# Patient Record
Sex: FEMALE | Race: WHITE | Employment: FULL TIME | URBAN - METROPOLITAN AREA
[De-identification: names, ages, dates, MRNs, and addresses within clinical notes are randomized per-mention and may not be internally consistent; named-entity substitution may affect disease eponyms.]

---

## 2017-04-13 PROBLEM — R07.89 ATYPICAL CHEST PAIN: Status: ACTIVE | Noted: 2017-04-13

## 2017-05-25 PROBLEM — R07.89 ATYPICAL CHEST PAIN: Chronic | Status: ACTIVE | Noted: 2017-04-13

## 2017-05-25 PROBLEM — R00.2 PALPITATIONS: Chronic | Status: ACTIVE | Noted: 2017-04-13

## 2022-08-03 PROBLEM — R31.29 MICROHEMATURIA: Status: ACTIVE | Noted: 2022-08-03

## 2023-04-13 LAB — PAP SMEAR, EXTERNAL: NEGATIVE

## 2023-06-28 ENCOUNTER — PATIENT MESSAGE (OUTPATIENT)
Dept: FAMILY MEDICINE CLINIC | Age: 33
End: 2023-06-28

## 2023-06-28 DIAGNOSIS — R20.0 NUMBNESS AND TINGLING: ICD-10-CM

## 2023-06-28 DIAGNOSIS — R20.2 NUMBNESS AND TINGLING: ICD-10-CM

## 2023-06-28 DIAGNOSIS — Z13.220 SCREENING FOR LIPOID DISORDERS: Primary | ICD-10-CM

## 2023-06-28 DIAGNOSIS — R42 DIZZINESS: ICD-10-CM

## 2023-06-29 ENCOUNTER — HOSPITAL ENCOUNTER (OUTPATIENT)
Age: 33
Discharge: HOME | End: 2023-06-29
Payer: COMMERCIAL

## 2023-06-29 DIAGNOSIS — R20.2 NUMBNESS AND TINGLING: ICD-10-CM

## 2023-06-29 DIAGNOSIS — R42 DIZZINESS: ICD-10-CM

## 2023-06-29 DIAGNOSIS — Z13.220 SCREENING FOR LIPOID DISORDERS: ICD-10-CM

## 2023-06-29 DIAGNOSIS — R20.0 NUMBNESS AND TINGLING: ICD-10-CM

## 2023-06-29 LAB
ANION GAP SERPL CALCULATED.3IONS-SCNC: 15 MMOL/L (ref 9–17)
BASOPHILS # BLD: 0.04 K/UL (ref 0–0.2)
BASOPHILS NFR BLD: 1 % (ref 0–2)
BUN SERPL-MCNC: 12 MG/DL (ref 6–20)
CALCIUM SERPL-MCNC: 9.5 MG/DL (ref 8.6–10.4)
CHLORIDE SERPL-SCNC: 101 MMOL/L (ref 98–107)
CHOLEST SERPL-MCNC: 220 MG/DL
CHOLESTEROL/HDL RATIO: 2.5
CO2 SERPL-SCNC: 24 MMOL/L (ref 20–31)
CREAT SERPL-MCNC: 0.69 MG/DL (ref 0.5–0.9)
EOSINOPHIL # BLD: 0.07 K/UL (ref 0–0.44)
EOSINOPHILS RELATIVE PERCENT: 2 % (ref 1–4)
ERYTHROCYTE [DISTWIDTH] IN BLOOD BY AUTOMATED COUNT: 13.2 % (ref 11.8–14.4)
GFR SERPL CREATININE-BSD FRML MDRD: >60 ML/MIN/1.73M2
GLUCOSE SERPL-MCNC: 81 MG/DL (ref 70–99)
HCT VFR BLD AUTO: 42.1 % (ref 36.3–47.1)
HDLC SERPL-MCNC: 87 MG/DL
HGB BLD-MCNC: 13.4 G/DL (ref 11.9–15.1)
IMM GRANULOCYTES # BLD AUTO: <0.03 K/UL (ref 0–0.3)
IMM GRANULOCYTES NFR BLD: 0 %
LDLC SERPL CALC-MCNC: 118 MG/DL (ref 0–130)
LYMPHOCYTES # BLD: 28 % (ref 24–43)
LYMPHOCYTES NFR BLD: 1.35 K/UL (ref 1.1–3.7)
MCH RBC QN AUTO: 29.4 PG (ref 25.2–33.5)
MCHC RBC AUTO-ENTMCNC: 31.8 G/DL (ref 28.4–34.8)
MCV RBC AUTO: 92.3 FL (ref 82.6–102.9)
MONOCYTES NFR BLD: 0.39 K/UL (ref 0.1–1.2)
MONOCYTES NFR BLD: 8 % (ref 3–12)
NEUTROPHILS NFR BLD: 61 % (ref 36–65)
NEUTS SEG NFR BLD: 2.93 K/UL (ref 1.5–8.1)
NRBC BLD-RTO: 0 PER 100 WBC
PLATELET # BLD AUTO: 242 K/UL (ref 138–453)
PMV BLD AUTO: 9.7 FL (ref 8.1–13.5)
POTASSIUM SERPL-SCNC: 4.5 MMOL/L (ref 3.7–5.3)
RBC # BLD AUTO: 4.56 M/UL (ref 3.95–5.11)
SODIUM SERPL-SCNC: 140 MMOL/L (ref 135–144)
TRIGL SERPL-MCNC: 77 MG/DL
VIT B12 SERPL-MCNC: 785 PG/ML (ref 232–1245)
WBC OTHER # BLD: 4.8 K/UL (ref 3.5–11.3)

## 2023-06-29 PROCEDURE — 80048 BASIC METABOLIC PNL TOTAL CA: CPT

## 2023-06-29 PROCEDURE — 80061 LIPID PANEL: CPT

## 2023-06-29 PROCEDURE — 85027 COMPLETE CBC AUTOMATED: CPT

## 2023-06-29 PROCEDURE — 82607 VITAMIN B-12: CPT

## 2023-06-29 PROCEDURE — 36415 COLL VENOUS BLD VENIPUNCTURE: CPT

## 2024-03-09 ASSESSMENT — PATIENT HEALTH QUESTIONNAIRE - PHQ9
2. FEELING DOWN, DEPRESSED OR HOPELESS: SEVERAL DAYS
SUM OF ALL RESPONSES TO PHQ QUESTIONS 1-9: 2
1. LITTLE INTEREST OR PLEASURE IN DOING THINGS: 1
SUM OF ALL RESPONSES TO PHQ QUESTIONS 1-9: 2
SUM OF ALL RESPONSES TO PHQ QUESTIONS 1-9: 2
SUM OF ALL RESPONSES TO PHQ9 QUESTIONS 1 & 2: 2
2. FEELING DOWN, DEPRESSED OR HOPELESS: 1
SUM OF ALL RESPONSES TO PHQ QUESTIONS 1-9: 2
SUM OF ALL RESPONSES TO PHQ9 QUESTIONS 1 & 2: 2
1. LITTLE INTEREST OR PLEASURE IN DOING THINGS: SEVERAL DAYS

## 2024-03-11 ENCOUNTER — OFFICE VISIT (OUTPATIENT)
Dept: FAMILY MEDICINE CLINIC | Age: 34
End: 2024-03-11
Payer: COMMERCIAL

## 2024-03-11 VITALS
TEMPERATURE: 97.8 F | HEART RATE: 61 BPM | WEIGHT: 113 LBS | DIASTOLIC BLOOD PRESSURE: 64 MMHG | HEIGHT: 60 IN | BODY MASS INDEX: 22.19 KG/M2 | SYSTOLIC BLOOD PRESSURE: 112 MMHG | OXYGEN SATURATION: 99 %

## 2024-03-11 DIAGNOSIS — Z00.00 ENCOUNTER FOR WELL ADULT EXAM WITHOUT ABNORMAL FINDINGS: Primary | ICD-10-CM

## 2024-03-11 PROBLEM — I34.1 MITRAL VALVE PROLAPSE: Status: ACTIVE | Noted: 2024-03-11

## 2024-03-11 PROCEDURE — 99395 PREV VISIT EST AGE 18-39: CPT | Performed by: NURSE PRACTITIONER

## 2024-03-11 SDOH — ECONOMIC STABILITY: INCOME INSECURITY: HOW HARD IS IT FOR YOU TO PAY FOR THE VERY BASICS LIKE FOOD, HOUSING, MEDICAL CARE, AND HEATING?: NOT HARD AT ALL

## 2024-03-11 SDOH — ECONOMIC STABILITY: FOOD INSECURITY: WITHIN THE PAST 12 MONTHS, YOU WORRIED THAT YOUR FOOD WOULD RUN OUT BEFORE YOU GOT MONEY TO BUY MORE.: NEVER TRUE

## 2024-03-11 SDOH — ECONOMIC STABILITY: FOOD INSECURITY: WITHIN THE PAST 12 MONTHS, THE FOOD YOU BOUGHT JUST DIDN'T LAST AND YOU DIDN'T HAVE MONEY TO GET MORE.: NEVER TRUE

## 2024-03-11 SDOH — ECONOMIC STABILITY: HOUSING INSECURITY
IN THE LAST 12 MONTHS, WAS THERE A TIME WHEN YOU DID NOT HAVE A STEADY PLACE TO SLEEP OR SLEPT IN A SHELTER (INCLUDING NOW)?: NO

## 2024-03-11 ASSESSMENT — ENCOUNTER SYMPTOMS
ABDOMINAL PAIN: 0
SORE THROAT: 0
SHORTNESS OF BREATH: 0
SINUS PRESSURE: 0
RECTAL PAIN: 0
COUGH: 1
NAUSEA: 0
COLOR CHANGE: 0
CONSTIPATION: 0
DIARRHEA: 0
CHEST TIGHTNESS: 0
TROUBLE SWALLOWING: 0
WHEEZING: 0

## 2024-03-11 NOTE — PROGRESS NOTES
Well Adult Note  Name: Yoli Sanchez Today’s Date: 3/11/2024   MRN: [id number] Sex: Female   Age: 33 y.o. Ethnicity: Non- / Non    : 1990 Race: White (non-)      Yoli Sanchez is here for well adult exam.  History:  Doing well overall.  Recently moved back here from [facility] and got a divorce last year.  She is in a healthy relationship.  States she is trying to stay as active as she is able and trying to follow a healthy diet.  She had multiple illnesses in the last month and still has a lingering cough and mucus production.  Overall though much improved.    Review of Systems   Constitutional:  Negative for activity change, appetite change, chills, fatigue, fever and unexpected weight change.   HENT:  Negative for congestion, ear pain, postnasal drip, sinus pressure, sore throat and trouble swallowing.    Respiratory:  Positive for cough (lingering cough since recent illness). Negative for chest tightness, shortness of breath and wheezing.    Cardiovascular:  Negative for chest pain and leg swelling.   Gastrointestinal:  Negative for abdominal pain, constipation, diarrhea, nausea and rectal pain.   Endocrine: Negative for polydipsia, polyphagia and polyuria.   Genitourinary:  Negative for difficulty urinating, dyspareunia, dysuria, frequency, genital sores, menstrual problem, pelvic pain, vaginal bleeding, vaginal discharge and vaginal pain.   Musculoskeletal:  Negative for arthralgias and myalgias.   Skin:  Negative for color change and rash.   Neurological:  Negative for dizziness, weakness, numbness and headaches.   Hematological:  Negative for adenopathy.   Psychiatric/Behavioral:  Negative for dysphoric mood and sleep disturbance. The patient is not nervous/anxious.        No Known Allergies      Prior to Visit Medications    Not on File         Past Medical History:   Diagnosis Date    Acne     facial    Anxiety     depression? resolved    Depression

## 2024-03-11 NOTE — PROGRESS NOTES
Visit Information    Have you changed or started any medications since your last visit including any over-the-counter medicines, vitamins, or herbal medicines? no   Have you stopped taking any of your medications? Is so, why? -  no  Are you having any side effects from any of your medications? - no    Have you seen any other physician or provider since your last visit?  no   Have you had any other diagnostic tests since your last visit?  no   Have you been seen in the emergency room and/or had an admission in a hospital since we last saw you?  no   Have you had your routine dental cleaning in the past 6 months?  no     Do you have an active MyChart account? If no, what is the barrier?  Yes    Patient Care Team:  Tanya Perez APRN - CNP as PCP - General  Tanya Perez APRN - CNP as PCP - Empaneled Provider  Flako Amaya MD as Consulting Physician (Urology)    Medical History Review  Past Medical, Family, and Social History reviewed and  contribute to the patient presenting condition    Health Maintenance   Topic Date Due    Hepatitis B vaccine (1 of 3 - 3-dose series) Never done    COVID-19 Vaccine (1) Never done    Varicella vaccine (1 of 2 - 2-dose childhood series) Never done    DTaP/Tdap/Td vaccine (1 - Tdap) Never done    HPV vaccine (3 - 3-dose series) 12/09/2014    Flu vaccine (1) Never done    Cervical cancer screen  08/10/2023    Depression Screen  03/09/2025    Hepatitis C screen  Completed    HIV screen  Completed    Hepatitis A vaccine  Aged Out    Hib vaccine  Aged Out    Polio vaccine  Aged Out    Meningococcal (ACWY) vaccine  Aged Out    Pneumococcal 0-64 years Vaccine  Aged Out

## 2024-03-11 NOTE — PATIENT INSTRUCTIONS

## 2024-03-21 RX ORDER — AMOXICILLIN 875 MG/1
875 TABLET, COATED ORAL 2 TIMES DAILY
Qty: 20 TABLET | Refills: 0 | Status: SHIPPED | OUTPATIENT
Start: 2024-03-21 | End: 2024-03-31

## 2024-05-20 ENCOUNTER — PATIENT MESSAGE (OUTPATIENT)
Dept: FAMILY MEDICINE CLINIC | Age: 34
End: 2024-05-20

## 2024-05-20 DIAGNOSIS — L65.9 HAIR LOSS: Primary | ICD-10-CM

## 2024-05-20 NOTE — TELEPHONE ENCOUNTER
From: Yoli Sanchez  To: Tanya Perez  Sent: 5/20/2024 11:51 AM EDT  Subject: Thyroid Panel    Hi Dr. Martínez,  Im going through another phase of losing/thinning hair. I first experienced this 2ish years ago. I made the assumption it was due to side effects from Covid and stress at that time but now im concerned it could be resulting from something else. Jojo never had issues with hair loss in the past. Could we get a full panel listed below to test my Thyroids and any other panels you see fit for this issue?     FREE T4  FREE T3  TSH  RT3  THYROID ANTIBODIES (TPO and ANTITHYROGOBULIN)  VIT D LEVEL (25-hydroxy)    EBV  IRON  B12  HORMONES  IODINE  SELENIUM  4 POINT  SALIVA CORTISOL

## 2024-05-20 NOTE — TELEPHONE ENCOUNTER
Please write out a paper rx for salivary late night test x 1. Please fax this signed order along with other paper copy labs to # given

## 2024-05-21 ENCOUNTER — HOSPITAL ENCOUNTER (OUTPATIENT)
Age: 34
Setting detail: SPECIMEN
Discharge: HOME | End: 2024-05-21

## 2024-05-21 DIAGNOSIS — L65.9 HAIR LOSS: ICD-10-CM

## 2024-05-21 LAB
25(OH)D3 SERPL-MCNC: 39.2 NG/ML (ref 30–100)
DHEA-S SERPL-MCNC: 310 UG/DL (ref 98.8–340)
FERRITIN SERPL-MCNC: 31 NG/ML (ref 13–150)
IRON SATN MFR SERPL: 14 % (ref 20–55)
IRON SERPL-MCNC: 54 UG/DL (ref 37–145)
T3FREE SERPL-MCNC: 3 PG/ML (ref 2–4.4)
T4 FREE SERPL-MCNC: 0.9 NG/DL (ref 0.92–1.68)
TIBC SERPL-MCNC: 395 UG/DL (ref 250–450)
TSH SERPL DL<=0.05 MIU/L-ACNC: 2.36 UIU/ML (ref 0.27–4.2)
UNSATURATED IRON BINDING CAPACITY: 341 UG/DL (ref 112–347)
VIT B12 SERPL-MCNC: 1028 PG/ML (ref 232–1245)

## 2024-05-22 ENCOUNTER — HOSPITAL ENCOUNTER (OUTPATIENT)
Age: 34
Setting detail: SPECIMEN
Discharge: HOME | End: 2024-05-22

## 2024-05-23 LAB
EBV EA-D IGG SER-ACNC: 45 U/ML
EBV INTERPRETATION: ABNORMAL
EBV NA IGG SER IA-ACNC: 89 U/ML
EBV VCA IGG SER-ACNC: 942 U/ML
EBV VCA IGM SER-ACNC: 25 U/ML
IODINE SERPL-MCNC: 43.1 UG/L (ref 40–92)
SELENIUM SERPL-MCNC: 116.2 UG/L (ref 23–190)
THYROGLOBULIN AB: <12 IU/ML (ref 0–40)
THYROPEROXIDASE AB SERPL IA-ACNC: <4 IU/ML (ref 0–25)
ZINC SERPL-MCNC: 63.1 UG/DL (ref 60–120)

## 2024-05-25 LAB
CORTISOL SALIVARY: 0.03 UG/DL
ESTRADIOL LEVEL: 36.2 PG/ML
ESTROGEN TOTAL: 61.2 PG/ML
ESTRONE SERPL-MCNC: 25 PG/ML
T3 REVERSE: 9.1 NG/DL (ref 9–27)

## 2024-05-31 ENCOUNTER — TELEMEDICINE (OUTPATIENT)
Dept: FAMILY MEDICINE CLINIC | Age: 34
End: 2024-05-31
Payer: COMMERCIAL

## 2024-05-31 DIAGNOSIS — L65.9 HAIR LOSS: Primary | ICD-10-CM

## 2024-05-31 PROCEDURE — 99213 OFFICE O/P EST LOW 20 MIN: CPT | Performed by: NURSE PRACTITIONER

## 2024-05-31 NOTE — PROGRESS NOTES
Yoli Sanchez, was evaluated through a synchronous (real-time) audio-video encounter. The patient (or guardian if applicable) is aware that this is a billable service, which includes applicable co-pays. This Virtual Visit was conducted with patient's (and/or legal guardian's) consent. Patient identification was verified, and a caregiver was present when appropriate.   The patient was located at Home: [address] [place] [address]  Provider was located at Home (Appt Dept State): [place]  Confirm you are appropriately licensed, registered, or certified to deliver care in the state where the patient is located as indicated above. If you are not or unsure, please re-schedule the visit: Yes, I confirm.     Yoli Sanchez (:  1990) is a Established patient, presenting virtually for evaluation of the following:    Assessment & Plan   Below is the assessment and plan developed based on review of pertinent history, physical exam, labs, studies, and medications.  1. Hair loss  Reviewed all labs with pt  Ok to start Nutrafol hair growth supplement otc as this is rich in selenium, iodine, vitamin A, D, E, K, B, zinc also and she is borderline low on some of these vitamins  Increase protein, reduce stress, and avoid washing hair daily if able  Call INB or worsening        No follow-ups on file.       Subjective   HPI  Patient calling in today to review recent blood work she requested for hair loss concerns.  States she has been having episodic flareups of hair loss and unsure if it is related to stress or deficiencies.  She is taking B12 complex, vitamin C and biotin supplements currently with no major improvement    Review of Systems       Objective   Patient-Reported Vitals  No data recorded     Physical Exam  [INSTRUCTIONS:  \"[x]\" Indicates a positive item  \"[]\" Indicates a negative item  -- DELETE ALL ITEMS NOT EXAMINED]    Constitutional: [x] Appears well-developed and well-nourished [x] No

## 2024-05-31 NOTE — PROGRESS NOTES
Visit Information    Have you changed or started any medications since your last visit including any over-the-counter medicines, vitamins, or herbal medicines? no   Have you stopped taking any of your medications? Is so, why? -  no  Are you having any side effects from any of your medications? - no    Have you seen any other physician or provider since your last visit?  no   Have you had any other diagnostic tests since your last visit?  no   Have you been seen in the emergency room and/or had an admission in a hospital since we last saw you?  no   Have you had your routine dental cleaning in the past 6 months?  no     Do you have an active MyChart account? If no, what is the barrier?  Yes    Patient Care Team:  Tanya Perez APRN - CNP as PCP - General  Tanya Perez APRN - CNP as PCP - Empaneled Provider  Flako Amaya MD as Consulting Physician (Urology)    Medical History Review  Past Medical, Family, and Social History reviewed and  contribute to the patient presenting condition    Health Maintenance   Topic Date Due    Hepatitis B vaccine (1 of 3 - 3-dose series) Never done    COVID-19 Vaccine (1) Never done    Varicella vaccine (1 of 2 - 2-dose childhood series) Never done    DTaP/Tdap/Td vaccine (1 - Tdap) Never done    HPV vaccine (3 - 3-dose series) 12/09/2014    Flu vaccine (Season Ended) 08/01/2024    Depression Screen  03/09/2025    Cervical cancer screen  04/13/2026    Hepatitis C screen  Completed    HIV screen  Completed    Hepatitis A vaccine  Aged Out    Hib vaccine  Aged Out    Polio vaccine  Aged Out    Meningococcal (ACWY) vaccine  Aged Out    Pneumococcal 0-64 years Vaccine  Aged Out

## 2024-08-17 ENCOUNTER — OFFICE VISIT (OUTPATIENT)
Dept: FAMILY MEDICINE CLINIC | Age: 34
End: 2024-08-17
Payer: COMMERCIAL

## 2024-08-17 VITALS
DIASTOLIC BLOOD PRESSURE: 62 MMHG | SYSTOLIC BLOOD PRESSURE: 102 MMHG | RESPIRATION RATE: 14 BRPM | HEART RATE: 74 BPM | WEIGHT: 111.2 LBS | OXYGEN SATURATION: 99 % | BODY MASS INDEX: 21.72 KG/M2

## 2024-08-17 DIAGNOSIS — R07.89 MUSCULOSKELETAL CHEST PAIN: Primary | ICD-10-CM

## 2024-08-17 PROCEDURE — 99213 OFFICE O/P EST LOW 20 MIN: CPT | Performed by: NURSE PRACTITIONER

## 2024-08-17 RX ORDER — IBUPROFEN 800 MG/1
800 TABLET ORAL EVERY 8 HOURS PRN
COMMUNITY
Start: 2024-08-17

## 2024-08-17 ASSESSMENT — ENCOUNTER SYMPTOMS
NAUSEA: 0
SHORTNESS OF BREATH: 0
VOMITING: 0
COLOR CHANGE: 0

## 2024-08-17 NOTE — PROGRESS NOTES
[facility] WALK-[facility]  [address]  [address]  [facility] [address]  Dept: [phone]  Dept Fax: [phone]    Yoli Sanchez is a 33 y.o. female who presents today for her medical conditions/complaints of   Chief Complaint   Patient presents with    Chest Pain     Left side, can feel the pain when breathing or laying on that side, x 2 weeks          HPI:     /62   Pulse 74   Resp 14   Wt 50.4 kg (111 lb 3.2 oz)   SpO2 99%   BMI 21.72 kg/m²       HPI  Pt presented to the walk in today with c/o left anterior rib/side pain x 2 weeks.  Started after trying a new swing with golfing. Pain is constant but increases with movement and stretching.   No fever, rash, URI symptoms, dysuria.  She took Tylenol with little relief.     Past Medical History:   Diagnosis Date    Acne     facial    Anxiety     depression? resolved    Depression     Genital warts 1/18/2013    CONDYLOMA ACUMINATUM    Hemorrhoid     Melorheostosis     per bone bx 7/2015    MVP (mitral valve prolapse)     PMS (premenstrual syndrome)         Past Surgical History:   Procedure Laterality Date    CYST REMOVAL  2007    Left hand    UPPER GASTROINTESTINAL ENDOSCOPY  12/07/2016    no lesions seen-biopsy done--chronic gastritis    VULVAR/PERINEAL BIOPSY  1/18/2013    due to vaginal irritation       Family History   Problem Relation Age of Onset    Other Mother         HD, depression, arthritis    Heart Disease Mother     High Cholesterol Mother     High Blood Pressure Mother     Arthritis Mother         RA    Diabetes Mother         type 1    Obesity Mother     Depression Mother     Other Sister         depression, anxiety, YUNI syndrome 4/g 5/g genotype    Depression Sister     Miscarriages / Stillbirths Sister     Other Maternal Grandfather         hd, arthritis    Heart Disease Maternal Grandfather     High Cholesterol Maternal Grandfather     Diabetes Maternal

## 2024-09-02 ENCOUNTER — HOSPITAL ENCOUNTER (EMERGENCY)
Age: 34
Discharge: HOME | End: 2024-09-02
Attending: EMERGENCY MEDICINE
Payer: COMMERCIAL

## 2024-09-02 VITALS
BODY MASS INDEX: 21.99 KG/M2 | WEIGHT: 112 LBS | SYSTOLIC BLOOD PRESSURE: 124 MMHG | HEART RATE: 71 BPM | RESPIRATION RATE: 14 BRPM | OXYGEN SATURATION: 100 % | HEIGHT: 60 IN | DIASTOLIC BLOOD PRESSURE: 74 MMHG | TEMPERATURE: 98.7 F

## 2024-09-02 DIAGNOSIS — S61.217A LACERATION OF LEFT LITTLE FINGER WITHOUT FOREIGN BODY WITHOUT DAMAGE TO NAIL, INITIAL ENCOUNTER: Primary | ICD-10-CM

## 2024-09-02 PROCEDURE — 12001 RPR S/N/AX/GEN/TRNK 2.5CM/<: CPT

## 2024-09-02 PROCEDURE — 99282 EMERGENCY DEPT VISIT SF MDM: CPT

## 2024-09-02 ASSESSMENT — ENCOUNTER SYMPTOMS
COLOR CHANGE: 0
RHINORRHEA: 0
VOMITING: 0
EYE PAIN: 0
SORE THROAT: 0
EYE ITCHING: 0
NAUSEA: 0
EYE DISCHARGE: 0
WHEEZING: 0
BACK PAIN: 0
COUGH: 0

## 2024-09-02 ASSESSMENT — PAIN - FUNCTIONAL ASSESSMENT: PAIN_FUNCTIONAL_ASSESSMENT: NONE - DENIES PAIN

## 2024-09-03 NOTE — ED PROVIDER NOTES
EMERGENCY DEPARTMENT ENCOUNTER    Pt Name: Yoli Sanchez  MRN: [id number]  Birthdate 1990  Date of evaluation: 9/2/24  CHIEF COMPLAINT       Chief Complaint   Patient presents with    Laceration     Pt arrives with co left pinky finger laceration which occurred when pt was slicing cabbage at home. Pt states the top layer got sliced off and \"wont stop bleeding\"     HISTORY OF PRESENT ILLNESS   Patient is a 33-year-old female who presents with her spouse for evaluation of a laceration to the left small finger.  This occurred around 11 AM this morning while she was cutting some cabbage with a 's knife and accidentally cut the pad of her finger.  She states that it was bleeding very vigorously.  They tried to hold pressure and change out the dressing multiple times but it is still bleeding profusely.  She is not on any blood thinners.  Her tetanus immunization is not up-to-date but she does politely declined.  The patient denies any numbness or tingling.  She has full range of motion of the finger.  She is right-hand dominant.             REVIEW OF SYSTEMS     Review of Systems   Constitutional:  Negative for chills and fever.   HENT:  Negative for ear pain, rhinorrhea and sore throat.    Eyes:  Negative for pain, discharge and itching.   Respiratory:  Negative for cough and wheezing.    Cardiovascular:  Negative for chest pain and palpitations.   Gastrointestinal:  Negative for nausea and vomiting.   Genitourinary:  Negative for difficulty urinating and dysuria.   Musculoskeletal:  Negative for back pain and myalgias.   Skin:  Positive for wound. Negative for color change.   Neurological:  Negative for dizziness and headaches.   Psychiatric/Behavioral:  Negative for dysphoric mood.      PASTMEDICAL HISTORY     Past Medical History:   Diagnosis Date    Acne     facial    Anxiety     depression? resolved    Depression     Genital warts 1/18/2013    CONDYLOMA ACUMINATUM    Hemorrhoid     Melorheostosis

## 2024-09-03 NOTE — DISCHARGE INSTRUCTIONS
Please leave the dressing in place for the next 48 hours.  After 48 hours you may gently remove    Please call your primary care physician's office to schedule follow-up    The sutures should be removed in 7 days, there are 5 sutures in place    Please watch for signs of infection such as increased redness swelling drainage or warmth    Return to the emergency department for any signs of infection or other emergent concerns    After the dressing comes off please wash at least 1 time daily with soap and water, you can wash it more than that and apply bacitracin and cover    Please do not soak the area like with doing dishes for a long period of time or when swimming.

## 2024-09-03 NOTE — ED PROVIDER NOTES
[facility] Emergency Department  [address]  [facility] [facility] [address]  Phone: [phone]  Fax: [phone]      Attending Physician Attestation    I performed a history and physical examination of the patient and discussed management with the mid level provider. I reviewed the mid level provider's note and agree with the documented findings and plan of care. Any areas of disagreement are noted on the chart. I was personally present for the key portions of any procedures. I have documented in the chart those procedures where I was not present during the key portions. I have reviewed the emergency nurses triage note. I agree with the chief complaint, past medical history, past surgical history, allergies, medications, social and family history as documented unless otherwise noted below. Documentation of the HPI, Physical Exam and Medical Decision Making performed by mid level providers is based on my personal performance of the HPI, PE and MDM. For Physician Assistant/ Nurse Practitioner cases/documentation I have personally evaluated this patient and have completed at least one if not all key elements of the E/M (history, physical exam, and MDM). Additional findings are as noted.      CHIEF COMPLAINT       Chief Complaint   Patient presents with    Laceration     Pt arrives with co left pinky finger laceration which occurred when pt was slicing cabbage at home. Pt states the top layer got sliced off and \"wont stop bleeding\"         HISTORY OF PRESENT ILLNESS    Yoli Sanchez is a 33 y.o. female who presents to the emergency department today after she accidentally avulsed the pad of her right fifth digit cutting vegetables.  She has had continued issues with the bleeding and therefore she presents here.      PAST MEDICAL HISTORY    has a past medical history of Acne, Anxiety, Depression, Genital warts, Hemorrhoid, Melorheostosis, MVP (mitral valve prolapse), and PMS

## 2024-09-11 ENCOUNTER — OFFICE VISIT (OUTPATIENT)
Dept: FAMILY MEDICINE CLINIC | Age: 34
End: 2024-09-11

## 2024-09-11 VITALS
RESPIRATION RATE: 14 BRPM | TEMPERATURE: 98.8 F | SYSTOLIC BLOOD PRESSURE: 110 MMHG | OXYGEN SATURATION: 98 % | DIASTOLIC BLOOD PRESSURE: 62 MMHG | HEART RATE: 85 BPM

## 2024-09-11 DIAGNOSIS — Z48.02 VISIT FOR SUTURE REMOVAL: ICD-10-CM

## 2024-09-11 DIAGNOSIS — S61.217D LACERATION OF LEFT LITTLE FINGER WITHOUT FOREIGN BODY WITHOUT DAMAGE TO NAIL, SUBSEQUENT ENCOUNTER: Primary | ICD-10-CM
